# Patient Record
Sex: MALE | Race: BLACK OR AFRICAN AMERICAN
[De-identification: names, ages, dates, MRNs, and addresses within clinical notes are randomized per-mention and may not be internally consistent; named-entity substitution may affect disease eponyms.]

---

## 2018-03-23 ENCOUNTER — HOSPITAL ENCOUNTER (EMERGENCY)
Dept: HOSPITAL 63 - ER | Age: 28
Discharge: HOME | End: 2018-03-23
Payer: SELF-PAY

## 2018-03-23 VITALS — SYSTOLIC BLOOD PRESSURE: 129 MMHG | DIASTOLIC BLOOD PRESSURE: 83 MMHG

## 2018-03-23 VITALS — WEIGHT: 142 LBS | HEIGHT: 66 IN | BODY MASS INDEX: 22.82 KG/M2
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
## 2018-03-23 DIAGNOSIS — S51.811A: Primary | ICD-10-CM
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
## 2018-03-23 PROCEDURE — 73090 X-RAY EXAM OF FOREARM: CPT

## 2018-03-23 PROCEDURE — 12002 RPR S/N/AX/GEN/TRNK2.6-7.5CM: CPT

## 2018-03-23 PROCEDURE — 99284 EMERGENCY DEPT VISIT MOD MDM: CPT

## 2018-03-23 NOTE — PHYS DOC
Past History


Past Medical History:  No Pertinent History


Past Surgical History:  No Surgical History


Alcohol Use:  None


Drug Use:  None





Adult General


Chief Complaint


Chief Complaint:  LACERATION/AVULSION





HPI


HPI





Patient is a 27-year-old male presenting to the emergency department for 

evaluation of right forearm laceration sustained shortly prior to arrival when 

he was carrying a glass tabletop that broke and cut his right forearm.  He 

denies any weakness numbness or tingling.  Patient is healthy and says that his 

tetanus status is up-to-date.





Review of Systems


Review of Systems





Constitutional: Denies fever or chills []


Integument:  + laceration


Neurologic: Denies focal weakness or sensory changes []





All other systems were reviewed and found to be within normal limits, except as 

documented in this note.





Current Medications


Current Medications





Current Medications








 Medications


  (Trade)  Dose


 Ordered  Sig/[facility]  Start Time


 Stop Time Status Last Admin


Dose Admin


 


 Lidocaine/


 Epinephrine


  (Xylocaine


 2%-Epi 1:100,000)  20 ml  1X  ONCE  3/23/18 19:30


 3/23/18 19:31 DC 3/23/18 19:10


20 ML











Allergies


Allergies





Allergies








Coded Allergies Type Severity Reaction Last Updated Verified


 


  No Known Drug Allergies    3/23/18 No











Physical Exam


Physical Exam





Constitutional: Well developed, well nourished, no acute distress, non-toxic 

appearance. []


Extremities: Right forearm with approximate 4 cm laceration that goes through 

subcutaneous tissue and I can visualize the fascia it appears there is a small 

cut in the fascia but there is no exposed tendon.  I examine the wound in a 

bloodless field and there is also no foreign body.  He has 5 out of 5 strength 

in each finger flexion and extension as well as 5 out of 5 strength in wrist 

flexion and extension.


Neurologic: Alert and oriented X 3, normal motor function, normal sensory 

function, no focal deficits noted. []





Current Patient Data


Vital Signs





 Vital Signs








  Date Time  Temp Pulse Resp B/P (MAP) Pulse Ox O2 Delivery O2 Flow Rate FiO2


 


3/23/18 18:57 98.2 68 18  97 Room Air  











EKG


EKG


[]





Radiology/Procedures


Radiology/Procedures


Right forearm x-ray shows no fracture dislocation or soft tissue foreign body 

there does appear to be a laceration to the skin but no subcutaneous gas.


Impressions:


Indication: [R forearm laceration]





Procedure: The patient was placed in the appropriate position and anesthesia 

approximate 7 mL of 2% lidocaine with epinephrine. The area was then irrigated 

copiously with saline. The laceration was closed with 5 3-0 nylon sutures. The 

wound area was then dressed with Kerlix





Total repaired wound length: 4 cm





The patient tolerated the procedure well





Complications: None





Course & Med Decision Making


Course & Med Decision Making


Patient with wound that extends to the fascia but not through and there is no 

signs or symptoms of tendon involvement on physical exam.  Patient told not to 

use the right arm to lift anything more than 5 pounds and to come back to have 

the sutures taken out in 7-10 days and come back sooner with any new worsening 

pain weakness numbness or other general concerns.  Patient aware and agreeable 

with plan and verbalized understanding of the above instructions.





Dragon Disclaimer


Dragon Disclaimer


This electronic medical record was generated, in whole or in part, using a 

voice recognition dictation system.





Departure


Departure:


Impression:  


 Primary Impression:  


 Forearm laceration


Disposition:  01 HOME, SELF-CARE


Condition:  STABLE


Patient Instructions:  Laceration Care, Adult





Additional Instructions:  


HAVE THE SUTURES REMOVED IN 7-10 DAYS.  COME BACK WITH ANY CONCERNS.  THANK YOU!





Problem Qualifiers








 Primary Impression:  


 Forearm laceration


 Encounter type:  initial encounter  Laterality:  right  Qualified Codes:  

S51.811A - Laceration without foreign body of right forearm, initial encounter








MAI CHILDS DO Mar 23, 2018 19:37

## 2018-03-24 NOTE — RAD
Right forearm, 2 views, 3/23/2018:



History: Forearm laceration



No fracture or bony abnormality is detected.  No radiopaque foreign body is

evident in the soft tissues.



IMPRESSION: No significant abnormality is detected.

## 2018-05-20 ENCOUNTER — HOSPITAL ENCOUNTER (EMERGENCY)
Dept: HOSPITAL 63 - ER | Age: 28
Discharge: HOME | End: 2018-05-20
Payer: SELF-PAY

## 2018-05-20 VITALS — WEIGHT: 142 LBS | BODY MASS INDEX: 25.16 KG/M2 | HEIGHT: 63 IN

## 2018-05-20 VITALS — DIASTOLIC BLOOD PRESSURE: 71 MMHG | SYSTOLIC BLOOD PRESSURE: 107 MMHG

## 2018-05-20 DIAGNOSIS — S63.501A: Primary | ICD-10-CM
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
## 2018-05-20 DIAGNOSIS — S50.11XA: ICD-10-CM

## 2018-05-20 PROCEDURE — 29125 APPL SHORT ARM SPLINT STATIC: CPT

## 2018-05-20 PROCEDURE — 73110 X-RAY EXAM OF WRIST: CPT

## 2018-05-20 PROCEDURE — 99284 EMERGENCY DEPT VISIT MOD MDM: CPT

## 2018-05-20 NOTE — PHYS DOC
Past History


Past Medical History:  No Pertinent History


Past Surgical History:  No Surgical History


Alcohol Use:  None


Drug Use:  None





Adult General


Chief Complaint


Chief Complaint:  WRIST PAIN





[facility]


HPI





27-year-old male patient complaining of right wrist intermittent pain with 

activity for the last 2 weeks without known injury or focal neuro deficit. 

Patient states he had right forearm laceration with given last 2 months ago 

without any problem.





Review of Systems


Review of Systems





Constitutional: Denies fever or chills []


Eyes: Denies change in visual acuity, redness, or eye pain []


HENT: Denies nasal congestion or sore throat []


Respiratory: Denies cough or shortness of breath []


Cardiovascular: No additional information not addressed in HPI []


GI: Denies abdominal pain, nausea, vomiting, bloody stools or diarrhea []


: Denies dysuria or hematuria []


Musculoskeletal: Reports joint pain, denies back pain 


Integument: Denies rash or skin lesions []


Neurologic: Denies headache, focal weakness or sensory changes []


Endocrine: Denies polyuria or polydipsia []





All other systems were reviewed and found to be within normal limits, except as 

documented in this note.





Allergies


Allergies





Allergies








Coded Allergies Type Severity Reaction Last Updated Verified


 


  No Known Drug Allergies    3/23/18 No











Physical Exam


Physical Exam





Constitutional: Well developed, well nourished, no acute distress, non-toxic 

appearance. []


HENT: Normocephalic, atraumatic


Eyes: PERRLA, EOMI, conjunctiva normal, no discharge. [] 


Neck: Normal range of motion, no tenderness, supple, no stridor. [] 


Cardiovascular:Heart rate regular rhythm, no murmur []


Lungs & Thorax:  Bilateral breath sounds clear to auscultation []


Extremities: Right forearm and wrist without deformity, small contusion in the 

right distal forearm without limited range of motion and focal neuro deficit


Neurologic: Alert and oriented X 3, normal motor function, normal sensory 

function, no focal deficits noted. []


Psychologic: Affect normal, judgement normal, mood normal. []





Current Patient Data


Vital Signs





 Vital Signs








  Date Time  Temp Pulse Resp B/P (MAP) Pulse Ox O2 Delivery O2 Flow Rate FiO2


 


18 12:08 98.3 65 16  98 Room Air  











EKG


EKG


[]





Radiology/Procedures


Radiology/Procedures


[][address] [place] [id number]


 [phone]


 


 IMAGING REPORT





 Signed





PATIENT: SULMA SAUCEDA ACCOUNT: [id number] MRN#: [id number]


: 1990 LOCATION: ER AGE: 27


SEX: M EXAM DT: 18 ACCESSION#: [id number]


STATUS: DEP ER ORD. PHYSICIAN: DEVIN VANG MD 


REASON: brusing/pain


PROCEDURE: WRIST 3V RIGHT





Three-view right wrist radiographs 2018


 


CLINICAL HISTORY: Right wrist pain for 2 weeks. Bruising.


 


PA, lateral and oblique digital radiographs of the right wrist were 


obtained. No fracture or dislocation of the right wrist is seen. No 


radiopaque foreign body is noted. Mild degenerative changes are seen 


involving the radiocarpal joint, mid carpal joint and first carpal 


metacarpal joint.


 


IMPRESSION: Mild degenerative changes are seen involving the right wrist. 


No acute osseous abnormality is seen.


 


Electronically signed by: Homer Monge MD (2018 12:53 PM) [facility]














DICTATED AND SIGNED BY:     HOMER MONGE MD


DATE:     18 1251





CC: DEVIN VANG MD; PCP,PRIMO ~





Course & Med Decision Making


Course & Med Decision Making


Pertinent  Imaging studies reviewed. (See chart for details)





[Evaluation of patient in ER showed 27-year-old male patient with complaining 

of intermittent episodes of wrist pain x rayt was unremarkable and Velcro 

splint was placed in ER.]





Dragon Disclaimer


Dragon Disclaimer


This electronic medical record was generated, in whole or in part, using a 

voice recognition dictation system.





Departure


Departure:


Impression:  


 Primary Impression:  


 Right wrist sprain


Disposition:  01 HOME, SELF-CARE (At 1240)


Condition:  STABLE


Referrals:  


PCPPRIMO (PCP)


Patient Instructions:  Wrist Sprain with [facility]





Additional Instructions:  


Apply ice on the affected area


Follow-up with your primary care physician in 3-5 days


Return to ER if not getting better


Scripts


Naproxen (NAPROSYN) 500 Mg Tablet


1 TAB PO BID, #20 TAB 1 Refill


   Prov: DEVIN VANG MD         18











DEVIN VANG MD May 20, 2018 12:36

## 2018-05-20 NOTE — RAD
Three-view right wrist radiographs 5/20/2018

 

CLINICAL HISTORY: Right wrist pain for 2 weeks. Bruising.

 

PA, lateral and oblique digital radiographs of the right wrist were 

obtained. No fracture or dislocation of the right wrist is seen. No 

radiopaque foreign body is noted. Mild degenerative changes are seen 

involving the radiocarpal joint, mid carpal joint and first carpal 

metacarpal joint.

 

IMPRESSION: Mild degenerative changes are seen involving the right wrist. 

No acute osseous abnormality is seen.

 

Electronically signed by: Homer Davis MD (5/20/2018 12:53 PM) [facility]

## 2018-07-25 ENCOUNTER — HOSPITAL ENCOUNTER (EMERGENCY)
Dept: HOSPITAL 63 - ER | Age: 28
Discharge: HOME | End: 2018-07-25
Payer: SELF-PAY

## 2018-07-25 VITALS — HEIGHT: 63 IN | WEIGHT: 143.3 LBS | BODY MASS INDEX: 25.39 KG/M2

## 2018-07-25 VITALS — DIASTOLIC BLOOD PRESSURE: 75 MMHG | SYSTOLIC BLOOD PRESSURE: 154 MMHG

## 2018-07-25 DIAGNOSIS — J45.909: ICD-10-CM

## 2018-07-25 DIAGNOSIS — G51.0: Primary | ICD-10-CM

## 2018-07-25 LAB
ANION GAP SERPL CALC-SCNC: 7 MMOL/L (ref 6–14)
BASOPHILS # BLD AUTO: 0 X10^3/UL (ref 0–0.2)
BASOPHILS NFR BLD: 1 % (ref 0–3)
CA-I SERPL ISE-MCNC: 8 MG/DL (ref 8–26)
CALCIUM SERPL-MCNC: 9.6 MG/DL (ref 8.5–10.1)
CHLORIDE SERPL-SCNC: 105 MMOL/L (ref 98–107)
CO2 SERPL-SCNC: 28 MMOL/L (ref 21–32)
CREAT SERPL-MCNC: 1.1 MG/DL (ref 0.7–1.3)
EOSINOPHIL NFR BLD: 0.1 X10^3/UL (ref 0–0.7)
EOSINOPHIL NFR BLD: 2 % (ref 0–3)
ERYTHROCYTE [DISTWIDTH] IN BLOOD BY AUTOMATED COUNT: 13.5 % (ref 11.5–14.5)
GFR SERPLBLD BASED ON 1.73 SQ M-ARVRAT: 95.8 ML/MIN
GLUCOSE SERPL-MCNC: 110 MG/DL (ref 70–99)
HCT VFR BLD CALC: 45.7 % (ref 39–53)
HGB BLD-MCNC: 15.4 G/DL (ref 13–17.5)
LYMPHOCYTES # BLD: 1.5 X10^3/UL (ref 1–4.8)
LYMPHOCYTES NFR BLD AUTO: 25 % (ref 24–48)
MCH RBC QN AUTO: 30 PG (ref 25–35)
MCHC RBC AUTO-ENTMCNC: 34 G/DL (ref 31–37)
MCV RBC AUTO: 90 FL (ref 79–100)
MONO #: 0.3 X10^3/UL (ref 0–1.1)
MONOCYTES NFR BLD: 6 % (ref 0–9)
NEUT #: 4 X10^3UL (ref 1.8–7.7)
NEUTROPHILS NFR BLD AUTO: 67 % (ref 31–73)
PLATELET # BLD AUTO: 278 X10^3/UL (ref 140–400)
POTASSIUM SERPL-SCNC: 3.6 MMOL/L (ref 3.5–5.1)
RBC # BLD AUTO: 5.07 X10^6/UL (ref 4.3–5.7)
SODIUM SERPL-SCNC: 140 MMOL/L (ref 136–145)
WBC # BLD AUTO: 5.9 X10^3/UL (ref 4–11)

## 2018-07-25 PROCEDURE — 36415 COLL VENOUS BLD VENIPUNCTURE: CPT

## 2018-07-25 PROCEDURE — 80048 BASIC METABOLIC PNL TOTAL CA: CPT

## 2018-07-25 PROCEDURE — 85025 COMPLETE CBC W/AUTO DIFF WBC: CPT

## 2018-07-25 PROCEDURE — 70450 CT HEAD/BRAIN W/O DYE: CPT

## 2018-07-25 PROCEDURE — 99285 EMERGENCY DEPT VISIT HI MDM: CPT

## 2018-07-25 NOTE — RAD
RS Compliance Statement:

 

One or more of the following individualized dose reduction techniques were

utilized for this examination:  

1. Automated exposure control  

2. Adjustment of the mA and/or kV according to patient size  

3. Use of iterative reconstruction technique

 

 

CT head without contrast 7/25/2018 3:25 PM

 

INDICATION: Left-sided facial droop.

 

COMPARISON: None available

 

TECHNIQUE: Multiple axial CT images of the head were obtained from skull 

base through the vertex without intravenous contrast. 

 

FINDINGS:

 

Head:

 

Ventricles, sulci and basal cisterns are within normal limits. There is no

hydrocephalus. Gray-white matter differentiation is normal. There is no 

acute intracranial hemorrhage. There is no mass, mass effect or midline 

shift. Posterior fossa is normal in appearance. Hyperattenuation within 

the basilar artery likely reflects hemoconcentration.

 

Visualized portions of the orbits are normal. Paranasal sinuses are well 

aerated. Mastoid air cells are well aerated. Scalp and calvaria are 

normal.

 

 

IMPRESSION:

 

No acute intracranial hemorrhage.

 

Hyperattenuation within the basilar artery likely reflects 

hemoconcentration.

 

Electronically signed by: Aruna Vick MD (7/25/2018 3:37 PM) 

[facility]

## 2018-07-25 NOTE — PHYS DOC
Past History


Past Medical History:  Asthma


Past Surgical History:  No Surgical History


Alcohol Use:  None


Drug Use:  None





Adult General


Chief Complaint


Chief Complaint:  NEURO SYMPTOMS/DEFICITS





HPI


HPI


28-year-old male who presents emergency department today with facial asymmetry. 

This started this morning when he woke up. Last known well was last night. He 

denies difficulty swallowing difficulty speaking vision changes. He denies 

unilateral weakness of his upper or lower extremities. He denies difficulty 

with his ambulation. Otherwise he has no other symptoms.





Review of systems negative for chest pain shortness of breath fevers chills. 

All other review of systems is negative unless otherwise noted in history of 

present illness.


Family history: He denies family history of stroke at a young age.





ED course: 28-year-old male presenting with unilateral facial weakness 

involving the forehead concerning for Bell's palsy. The remainder the neuro 

exam is unremarkable. Head CT obtained which is unremarkable along with blood 

tests which were unremarkable. We will start the patient on prednisone and 

acyclovir to follow up with PCP in 2-3 days.The patient has been examined and 

was not found to have an emergency medical condition. The patient was then 

discharged home in stable condition to follow up with their primary care 

physician over the next 2-3 days. They were to return if their symptoms 

worsened or if they were concerned for any reason.  They were also instructed 

to return to the emergency department if they were unable to get the 

recommended and appropriate follow-up.  Face-to-face discharge instructions and 

return precautions were given. Patient's questions were answered to their 

satisfaction. Patient is comfortable with plan.





Review of Systems


Review of Systems


SEE ABOVE.





Current Medications


Current Medications





Current Medications








 Medications


  (Trade)  Dose


 Ordered  Sig/[facility]  Start Time


 Stop Time Status Last Admin


Dose Admin


 


 Prednisone


  (Prednisone)  60 mg  1X  ONCE  7/25/18 15:45


 7/25/18 15:46 UNV  














Allergies


Allergies





Allergies








Coded Allergies Type Severity Reaction Last Updated Verified


 


  No Known Drug Allergies    7/25/18 No











Physical Exam


Physical Exam


SEE ABOVE





Constitutional: Well developed, well nourished, no acute distress, non-toxic 

appearance. 


HENT: Normocephalic, atraumatic, bilateral external ears normal, oropharynx 

moist, no oral exudates, nose normal. []


Eyes: PERRLA, EOMI, conjunctiva normal, no discharge.  


Neck: Normal range of motion, no tenderness, supple, no stridor. [] 


Cardiovascular:Heart rate regular rhythm, no murmur 


Lungs & Thorax:  Bilateral breath sounds clear to auscultation []


Abdomen: Bowel sounds normal, soft, no tenderness, no masses, no pulsatile 

masses. [] 


Skin: Warm, dry, no erythema, no rash.  


Back: No tenderness, no CVA tenderness. [] 


Extremities: No tenderness, no cyanosis, no clubbing, ROM intact, no edema.  


Neurologic: 








Mental status: Awake oriented and alert x3





Cranial nerves: Extraocular movements intact, pts left eyebrow tanner not raise, 

smile shows left sided paresis, uvula elevation nl, shoulder shrug intact 

bilaterally, tongue protrusion normal





DTRs: 2+





Sensation: equal and normal in all extremities





Strength: 5/5 in upper and lower extremities bilaterally


Normal gait


Finger to nose within normal limits.





Psychologic: Affect normal, judgement normal, mood normal. []





Current Patient Data


Vital Signs





 Vital Signs








  Date Time  Temp Pulse Resp B/P (MAP) Pulse Ox O2 Delivery O2 Flow Rate FiO2


 


7/25/18 15:08 98.2 65 18  95 Room Air  











EKG


EKG


[]





Radiology/Procedures


Radiology/Procedures


[]





Course & Med Decision Making


Course & Med Decision Making


Pertinent Labs and Imaging studies reviewed. (See chart for details)





[]





Dragon Disclaimer


Dragon Disclaimer


This electronic medical record was generated, in whole or in part, using a 

voice recognition dictation system.





Departure


Departure:


Impression:  


 Primary Impression:  


 Bell's palsy


Disposition:  01 HOME, SELF-CARE


Condition:  STABLE


Referrals:  


PCP,NO (PCP)








LEXY MCCRAY MD


Patient Instructions:  Bell's Palsy





Additional Instructions:  


Thank you for allowing us to participate in your care today.





Return to the emergency department you have any new or worsening symptoms, or 

if you are concerned for any reason. Return to emergency department if you have 

any  new or concerning symptoms including but not limited to fever, chills, 

nausea, vomiting, intractable pain, any new rashes, chest pain, shortness of air

, uncontrolled bleeding, difficulty breathing, and/or vision loss.





Follow up with your primary care physician within 3 days.  Call your Primary 

Doctor tomorrow and inform them of your visit today.  If you do not have a 

primary care provider we are happy to provide you with a list of our primary 

care providers contact information. 





This condition should be evaluated by your primary care physician and any 

recommended consulting services for continued management within 2-3 days after 

discharge. If at any time, you are having difficulty getting into your primary 

care doctor or a specialist, return to the emergency department.


Scripts


Valacyclovir Hcl (VALTREX) 1,000 Mg Tablet


1 TAB PO TID, #21 TAB


   Prov: NEVAEH GRIMES MD         7/25/18 


Prednisone (PREDNISONE) 20 Mg Tablet


3 TAB PO DAILY for 7 Days, #21 TAB


   start taking medicine on 7/26.


   Prov: NEVAEH GRIMES MD         7/25/18











NEVAEH GRIMES MD Jul 25, 2018 15:38